# Patient Record
Sex: MALE | Race: WHITE | NOT HISPANIC OR LATINO | Employment: OTHER | URBAN - METROPOLITAN AREA
[De-identification: names, ages, dates, MRNs, and addresses within clinical notes are randomized per-mention and may not be internally consistent; named-entity substitution may affect disease eponyms.]

---

## 2022-10-03 ENCOUNTER — OFFICE VISIT (OUTPATIENT)
Dept: FAMILY MEDICINE CLINIC | Facility: CLINIC | Age: 22
End: 2022-10-03

## 2022-10-03 VITALS
BODY MASS INDEX: 33.88 KG/M2 | OXYGEN SATURATION: 98 % | WEIGHT: 242 LBS | HEART RATE: 66 BPM | DIASTOLIC BLOOD PRESSURE: 84 MMHG | RESPIRATION RATE: 16 BRPM | HEIGHT: 71 IN | TEMPERATURE: 98.6 F | SYSTOLIC BLOOD PRESSURE: 122 MMHG

## 2022-10-03 DIAGNOSIS — F41.9 ANXIETY: Primary | ICD-10-CM

## 2022-10-03 PROCEDURE — 99203 OFFICE O/P NEW LOW 30 MIN: CPT | Performed by: PHYSICIAN ASSISTANT

## 2022-10-03 NOTE — PROGRESS NOTES
New Patient Office Visit      Date: 10/03/2022   Patient Name: Jonathan SUMMERLIN  : 2000   MRN: [id number]     Chief Complaint:    Chief Complaint   Patient presents with   • Anxiety     Est care       History of Present Illness: Jonathan SUMMERLIN is a 22 y.o. male who is here today to establish care.  He has been having problems with anxiety.  He has had issues before but it seems to be getting worse.  He denies any problems sleeping, appetite is fairly good, no other issues today.    Subjective      Review of Systems:   Review of Systems   Constitutional: Negative for fatigue and fever.   HENT: Negative for trouble swallowing.    Eyes: Negative for visual disturbance.   Respiratory: Negative for shortness of breath.    Cardiovascular: Negative for chest pain and leg swelling.   Psychiatric/Behavioral: The patient is nervous/anxious.         Past Medical History:   Past Medical History:   Diagnosis Date   • ADHD (attention deficit hyperactivity disorder)        Past Surgical History:   Past Surgical History:   Procedure Laterality Date   • TONSILLECTOMY         Family History:   Family History   Problem Relation Age of Onset   • Anxiety disorder Mother    • Depression Mother    • Mental illness Mother        Social History:   Social History     Socioeconomic History   • Marital status: Single   Tobacco Use   • Smoking status: Never   • Smokeless tobacco: Never   Substance and Sexual Activity   • Alcohol use: Yes     Alcohol/week: 8.0 standard drinks     Types: 4 Cans of beer, 4 Shots of liquor per week   • Drug use: Never   • Sexual activity: Yes     Partners: Female     Birth control/protection: Condom, I.U.D.       Medications:     Current Outpatient Medications:   •  sertraline (Zoloft) 50 MG tablet, Take 1 tablet by mouth Daily., Disp: 30 tablet, Rfl: 5    Allergies:   No Known Allergies    Objective     Vital Signs:   Vitals:    10/03/22 1109   BP: 122/84   Pulse: 66   Resp: 16   Temp: 98.6  "°F (37 °C)   SpO2: 98%   Weight: 110 kg (242 lb)   Height: 180.3 cm (71\")     Body mass index is 33.75 kg/m².   BMI is >= 30 and <35. (Class 1 Obesity). The following options were offered after discussion;: weight loss educational material (shared in after visit summary)      Physical Exam:   Physical Exam  Vitals and nursing note reviewed.   Constitutional:       Appearance: Normal appearance.   HENT:      Head: Normocephalic and atraumatic.   Cardiovascular:      Rate and Rhythm: Normal rate and regular rhythm.   Pulmonary:      Effort: Pulmonary effort is normal.      Breath sounds: Normal breath sounds. No decreased breath sounds, wheezing, rhonchi or rales.   Musculoskeletal:      Cervical back: Neck supple.      Right lower leg: No edema.      Left lower leg: No edema.   Lymphadenopathy:      Cervical: No cervical adenopathy.   Neurological:      Mental Status: He is alert.   Psychiatric:         Mood and Affect: Mood is anxious.            Assessment / Plan      Assessment/Plan:   Diagnoses and all orders for this visit:    1. Anxiety (Primary)  -     sertraline (Zoloft) 50 MG tablet; Take 1 tablet by mouth Daily.  Dispense: 30 tablet; Refill: 5         1. Trial of Zoloft as directed.  Follow-up in 4 weeks for recheck.  We discussed side effects and how to take this medication today.      Follow Up:   Return in about 4 weeks (around 10/31/2022) for Recheck.    Kita Dubon PA-C   [facility]     "

## 2022-10-26 DIAGNOSIS — F41.9 ANXIETY: ICD-10-CM

## 2022-10-27 DIAGNOSIS — F41.9 ANXIETY: ICD-10-CM

## 2022-10-31 ENCOUNTER — OFFICE VISIT (OUTPATIENT)
Dept: FAMILY MEDICINE CLINIC | Facility: CLINIC | Age: 22
End: 2022-10-31

## 2022-10-31 VITALS
DIASTOLIC BLOOD PRESSURE: 82 MMHG | WEIGHT: 245.2 LBS | TEMPERATURE: 98.1 F | OXYGEN SATURATION: 97 % | SYSTOLIC BLOOD PRESSURE: 102 MMHG | HEIGHT: 71 IN | BODY MASS INDEX: 34.33 KG/M2 | HEART RATE: 64 BPM

## 2022-10-31 DIAGNOSIS — F41.9 ANXIETY: Primary | ICD-10-CM

## 2022-10-31 PROCEDURE — 99213 OFFICE O/P EST LOW 20 MIN: CPT | Performed by: PHYSICIAN ASSISTANT

## 2022-10-31 RX ORDER — SERTRALINE HYDROCHLORIDE 100 MG/1
100 TABLET, FILM COATED ORAL DAILY
Qty: 90 TABLET | Refills: 1 | Status: SHIPPED | OUTPATIENT
Start: 2022-10-31

## 2023-10-16 ENCOUNTER — TELEPHONE (OUTPATIENT)
Dept: FAMILY MEDICINE CLINIC | Facility: CLINIC | Age: 23
End: 2023-10-16

## 2023-10-16 NOTE — TELEPHONE ENCOUNTER
Caller: SUMMERLIN, Jonathan    Relationship: Self    Best call back number:  [phone]     What medication are you requesting:  PATCHES FOR MOTION SICKNESS     What are your current symptoms:  PATIENT IS GOING ON A 8 DAY CRUISE ON 11-4-23    How long have you been experiencing symptoms:     Have you had these symptoms before:    [x] Yes  [] No    Have you been treated for these symptoms before:   [] Yes  [] No    If a prescription is needed, what is your preferred pharmacy and phone number: [id number] - [place], [place] - [address] AT [facility] [phone] [facility] [phone]      Additional notes:  REQUESTING PATCHES FOR A CRUISE

## 2023-10-17 DIAGNOSIS — T75.3XXA MOTION SICKNESS, INITIAL ENCOUNTER: Primary | ICD-10-CM

## 2023-10-17 RX ORDER — SCOLOPAMINE TRANSDERMAL SYSTEM 1 MG/1
1 PATCH, EXTENDED RELEASE TRANSDERMAL
Qty: 4 EACH | Refills: 0 | Status: SHIPPED | OUTPATIENT
Start: 2023-10-17

## 2023-10-17 NOTE — TELEPHONE ENCOUNTER
Name: SUMMERLIN, Jonathan  Relationship: Self  Best Callback Number: [phone]      HUB PROVIDED THE RELAY MESSAGE FROM THE OFFICE    PATIENT VOICED UNDERSTANDING AND HAS NO FURTHER QUESTIONS AT THIS TIME

## 2023-10-17 NOTE — TELEPHONE ENCOUNTER
Okay for the Hub to read:      A prescription was called in.     I called the pt and LVM advising to call the office.